# Patient Record
(demographics unavailable — no encounter records)

---

## 2024-12-12 NOTE — CONSULT LETTER
[Dear  ___] : Dear  [unfilled], [Courtesy Letter:] : I had the pleasure of seeing your patient, [unfilled], in my office today. [Please see my note below.] : Please see my note below. [Sincerely,] : Sincerely, [FreeTextEntry3] : Chau Kuhn MD Nephrology [DrAdelaide  ___] : Dr. GRAMAJO

## 2024-12-12 NOTE — ASSESSMENT
[FreeTextEntry1] : / Ms. Canela is a 65 year old woman with hypertension, hyperlipidemia, and CAD, here for evaluation and management of newly diagnosed kidney disease.  Outside of the unusually early onset CAD, Ms. Canela appears to be quite healthy, and her kidney function was ostensibly normal through 2023. A single creatinine value earlier this year was double her prior baseline. In a normal situation, I would dimiss it as a fluke or lab error, but Ms. Canela has an extremely strong family history of ESRD and her kidney ultrasound is quite concerning.  The imaging study from July 2024 reveals small kidneys (<9cm) described as atrophic, strongly suggesting a chronic pathologic process. While most patients show early signs of kidney disease via a drop in eGFR or proteinuria prior to renal atrophy, it's plausible that Ms. Canela has an unusual presentation where damage occurred "under the burger" for many years, unbeknownst to us all. Now that sufficient damage has been done, it has become overtly apparent in the blood testing.  All that said, her latest tests are 7 months old and require repeating. We will check both creatinine and cystatin-C based eGFR alongside more quantitative urine indices for total proteinuria and albuminuria.  Additionally, we only have the renal imaging report and we will work to obtain the actual images for review.  Finally, I already broached the subject of genetic testing given the unsual circumstances. I am not confident that finding an abnormality on genetic testing will alter management, but it might elucidate an etiology and have an impact on the rest of her family.  For today, we will make no changes. But she will follow up in short order to review updated results and renal imaging. Further diagnostic and therapeutic decisions will be made then.   PLAN: - Continue current medications - Recheck blood and urine tests - Patient to obtain actual radiology images (either via web link or CD) for review - Follow up 1 month   I spent a total of 60 minutes on this encounter.

## 2024-12-12 NOTE — HISTORY OF PRESENT ILLNESS
[FreeTextEntry1] : Contacts: Dr. Obdulio Walls (PCP) Dr. Melecio Garcias (cardiology)  ------------------------------------------------------------------------------- Problem List: Kidney disease Hypertension Hyperlipidemia CAD s/p MI and stenting to LAD (age 45)  ------------------------------------------------------------------------------- HPI: Ms. Canela is a 65 year old woman with hypertension, hyperlipidemia, and CAD, here for evaluation and management of newly diagnosed kidney disease.  Ms. Canela is generally quite healthy. She had a MI 20 years ago, in her 40s, but has been doing well since. She takes several antihypertensives, antiplatelets, and lipid lowering therapies. She is retired from working at [employer] and lives with her  in [place].  Notably, she has a strong family history of kidney failure. Her kidney function has been normal until her latest blood test in May 2024, which showed a doubling of her baseline serum creatinine. Follow up imaging revealed atrophic kidneys.  No dizziness/lightheadedness, headaches, chest pain, dyspnea, abdominal pain, dysuria, hematuria, leg swelling.  Takes Tylenol occasionally and avoids NSAIDs. No other OTCs, herbals, supplements, etc.  ------------------------------------------------------------------------------- Social History: Lifelong [place]; lives with ; has 2 children and 2 grandchildren Retired; formerly worked in education Former tobacco use; previously smoked 1 ppd  Family History: Father had hypertension and ESRD and was on dialysis for 10 years (followed by transplant) Mother had dementia (diagnosed age 80s) Brother has ESRD and recently started on dialysis (age 70); also has MS  ------------------------------------------------------------------------------- Allergies: NKDA  Medications:  Quinapril 10mg daily Hydrochlorothiazide 12.5mg Metoprolol ER 50mg daily Atorvastatin 80mg daily Ezetimibe 10mg daily Aspirin 81mg daily Clopidogrel 75mg Alprazolam as needed 	 ------------------------------------------------------------------------------- Physical Exam:  Gen: NAD, well-appearing HEENT: Supple neck Pulm: CTA CV: RRR Back: No spinal or CVA terndeness Abd: +BS, soft UE: Warm, FROM, intact strength LE: Warm, FROM, intact strength; no edema Neuro: No focal deficits, intact gait Psych: Normal affect Skin: Warm, dry 	 ------------------------------------------------------------------------------- Labs/Studies:   2024-05-23 SCr 1.64 (eGFR 35) 2023-03-02 SCr 0.83 2022-08-24 SCr 0.76 2021-08-23 SCr 0.64 2021-03-26 SCr 0.78 2020-08-26 SCr 0.74  2024-05-23 U/A: SG 1.030, protein trace, RBC 2, WBC 4  2024-05-23 Hgb 11.3 2024-05-23 LDL 78 2024-05-23 A1c 5.2  2024-05-23 HCV nonreactive 2024-05-23 HBsAg nonreactive  Kidney U/S 2024-07-03: Right 8.5, atrophic; L 8.8, atrophic

## 2025-02-20 NOTE — ASSESSMENT
[FreeTextEntry1] : / Ms. Canela is a 65 year old woman with hypertension, hyperlipidemia, and CAD, here for evaluation and management of newly diagnosed kidney disease.  Outside of the unusually early onset CAD, Ms. Canela appears to be quite healthy, and her kidney function was ostensibly normal through 2023. A single creatinine value earlier this year was double her prior baseline, and it has since improved although it remains about 25% above her prior baseline.  Overall, she has the following findings: - Creatinine 1, eGFR 60 - Cystatin-C 1.26, eGFR 52 - Combined eGFR 57 - UPCR, UACR, and U/S negative - Renal U/S with small kidneys and equivocal echotexture - Strong family history of CKD/ESRD  In particular, given the family history and the change in her creatinine over time, I am inclined to believe that she has some form of kidney disease, albeit mild at this time. She does not have high-risk features (save the strong family history) and she is already on lisinopril. It doesn't seem that SGLT2i would be indicated at this time, although it bears watching. We discussed the possibility of genetic testing, but I don't think it will  and we deferred at this time.  The plan at this time includes twice annual monitoring of kidney function and urine indices. We will also recheck a kidney ultrasound in 1 year. She should follow up in 1 year. In the interim, she should remain on her current medications and she should continue with healthy lifestyle choices.   PLAN: - Continue current medications - Labs in 6 months - Follow up 1 year, with likely repeat U/S at that time   I spent a total of 40 minutes on this encounter.

## 2025-02-20 NOTE — HISTORY OF PRESENT ILLNESS
[FreeTextEntry1] : Contacts: Dr. Obdulio Walls (PCP) Dr. Melecio Garcias (cardiology)  ------------------------------------------------------------------------------- Problem List: Chronic kidney disease stage IIIa Hypertension Hyperlipidemia CAD s/p MI and stenting to LAD (age 45)  ------------------------------------------------------------------------------- HPI: Ms. Canela is a 65 year old woman with hypertension, hyperlipidemia, and CAD, here for evaluation and management of newly diagnosed kidney disease.  Met Ms. Canela in December 2024, at which time there was some uncertainty about her underlying kidney function.   Following that visit, with updated labs and a review of her ultrasound, the feeling was that she may have kidney disease, though it is mild. In particular, the read of atrophic kidney was felt to be an overcall on the ultrasound. The creatinine value improved, though not back to her prior baseline of 0.8, and her cystatin-C was mildly elevated. Her urine tests were unremarkable, however.  Since that visit, has been feeling well.  No dizziness/lightheadedness, headaches, chest pain, dyspnea, abdominal pain, dysuria, hematuria, leg swelling.  Takes Tylenol occasionally and avoids NSAIDs. No other OTCs, herbals, supplements, etc.  ------------------------------------------------------------------------------- Social History: Lifelong [place]; lives with ; has 2 children and 2 grandchildren Retired; formerly worked in education Former tobacco use; previously smoked 1 ppd  Family History: Father had hypertension and ESRD and was on dialysis for 10 years (followed by transplant) Mother had dementia (diagnosed age 80s) Brother has ESRD and recently started on dialysis (age 70); also has MS  ------------------------------------------------------------------------------- Allergies: NKDA  Medications:  Lisinopril 10mg daily Hydrochlorothiazide 12.5mg Metoprolol ER 50mg daily Atorvastatin 80mg daily Ezetimibe 10mg daily Aspirin 81mg daily Clopidogrel 75mg Alprazolam as needed  ------------------------------------------------------------------------------- Physical Exam:  Gen: NAD, well-appearing HEENT: Supple neck Pulm: CTA CV: RRR Back: No spinal or CVA terndeness Abd: +BS, soft UE: Warm, FROM, intact strength LE: Warm, FROM, intact strength; no edema Neuro: No focal deficits, intact gait Psych: Normal affect Skin: Warm, dry  ------------------------------------------------------------------------------- Labs/Studies:  2025-02-17 SCr 1.04 (Cystatin-C 1.26) >>> COMBINED eGFR 57 2024-12-12 SCr 1.01 (Cystatin-C 1.33) >>> COMBINED eGFR 56 2024-05-23 SCr 1.64 (eGFR 35) 2023-03-02 SCr 0.83 2022-08-24 SCr 0.76 2021-08-23 SCr 0.64 2021-03-26 SCr 0.78 2020-08-26 SCr 0.74  2025-02-17 UACR negative 2024-12-12 UACR negative  2025-02-17 UPCR 0.1 g/g 2024-12-12 UPCR 0.1 g/g  2025-02-17 U/A: negative  2025-02-17 Hgb 12.1 2024-05-23 LDL 78 2024-05-23 A1c 5.2  2024-05-23 HCV nonreactive 2024-05-23 HBsAg nonreactive  Kidney U/S 2025-02-12: Right 8.5, L 8.1; equivocally increased cortical echotexture  Kidney U/S 2024-07-03: Right 8.5, atrophic; L 8.8, atrophic

## 2025-03-27 NOTE — HISTORY OF PRESENT ILLNESS
[FreeTextEntry1] : The patient is here for a follow up visit to review their medications and chronic medical conditions.  cad,htn,hld [de-identified] : Here for follow up visit. Doing well. Appetitie, sleep,bms,voiding, mood all ok.  Interim and relevant labs, imaging and consultations reviewed.

## 2025-03-27 NOTE — ASSESSMENT
[FreeTextEntry1] : cad stent lad- sees dr stern, tst 2 wks, cont atorv 80, asa/plavix- to ask cardo about need for dapt and colchicine, cont toprol htn- stable, cont toprol, hct, quinopril hld- atorv ckd3-renal disc. neph noted. on lisinopril and glp1. cont to monitor Discussed healthy lifestyle,discussed and updated vaccinations, healthy diet, exercise, chk labs, discussed appropriate screening tests including colonoscopy, mammo, bone density and pap.  Discussed the importance of screening for colon cancer. Reviewed screening reccomendations including colonoscopy, Cologuard and Fit DNA testing. I strongly encouraged colonoscopy as that is the best screening test to detect both colon cancer and polyps and is the gold standard.  Discussed the importance and benefit of a healthy lifestyle including a heart healthy diet such as the Mediterranean diet and regular exercise as well as 7 hours of sleep nightly.

## 2025-07-21 NOTE — IMAGING
[Bilateral] : knee bilaterally [All Views] : anteroposterior, lateral, skyline, and anteroposterior standing [Mild patellofemoral OA] : Mild patellofemoral OA [There are no fractures, subluxations or dislocations. No significant abnormalities are seen] : There are no fractures, subluxations or dislocations. No significant abnormalities are seen [de-identified] :   ----------------------------------------------------------------------------   Bilateral knee exam:   Skin: no significant pertinent finding Inspection:      (neg) Effusion      (neg) Malalignment      (neg) Swelling      (neg) Quad atrophy      (neg) J-sign ROM:      0 - 130 degrees of flexion. Tenderness:      (+R) MJLT      (neg) LJLT      (neg) Medial patellar facet tenderness      (neg) Lateral patellar facet tenderness      (neg) Crepitus      (neg) Patellar grind tenderness      (neg) Patellar tendon      (neg) Quad tendon      Other: Stability:      (neg) Lachman      (neg) Varus/Valgus instability      (neg) Posterior drawer      (neg) Patellar translation: wnl Additional tests:      (neg) McMurrays test      (neg) Patellar apprehension      Other: Strength: 5/5 Q/H/TA/GS/EHL Neuro: In tact to light touch throughout, DTR's wnl Vascularity: Extremity warm and well perfused Gait: normal

## 2025-07-21 NOTE — DISCUSSION/SUMMARY
[de-identified] : discussed csi/ ha inj/ PT/ nsaids indicated for nsaids - contraindicated due to anticoagulant use  Rx Voltaren gel provided PT rx  fu 2-3 mo Right knee may need further evaluation for MMT if no significant relief ----------------------------------------------------------------------------   All relevant imaging studies pertinent to today's visit, including x-rays, MRI's and/or other advanced imaging studies (CT/etc) were independently interpreted and reviewed with the patient as needed. Implications of the studies together with the patient's clinical picture were discussed to formulate a working diagnosis and management options were detailed.   The patient and/or guardian was advised of the diagnosis.  The natural history of the pathology was explained in full. All questions were answered.  The risks and benefits of conservative and interventional treatment alternatives were explained to the patient   The patient and/or guardian was advised if any advanced diagnostic/imaging study (MRI/CT/etc) is ordered to evaluate potential pathology in the affected area(s), they should follow up in the office to review the results of the study and determine further management that may be indicated.